# Patient Record
Sex: MALE | Race: WHITE | HISPANIC OR LATINO | Employment: FULL TIME | URBAN - METROPOLITAN AREA
[De-identification: names, ages, dates, MRNs, and addresses within clinical notes are randomized per-mention and may not be internally consistent; named-entity substitution may affect disease eponyms.]

---

## 2018-01-26 ENCOUNTER — HOSPITAL ENCOUNTER (EMERGENCY)
Facility: HOSPITAL | Age: 53
Discharge: HOME | End: 2018-01-26
Attending: EMERGENCY MEDICINE
Payer: COMMERCIAL

## 2018-01-26 VITALS
HEART RATE: 66 BPM | OXYGEN SATURATION: 96 % | TEMPERATURE: 98 F | BODY MASS INDEX: 30.06 KG/M2 | WEIGHT: 210 LBS | HEIGHT: 70 IN | RESPIRATION RATE: 18 BRPM | SYSTOLIC BLOOD PRESSURE: 146 MMHG | DIASTOLIC BLOOD PRESSURE: 67 MMHG

## 2018-01-26 DIAGNOSIS — R11.2 NON-INTRACTABLE VOMITING WITH NAUSEA, UNSPECIFIED VOMITING TYPE: ICD-10-CM

## 2018-01-26 DIAGNOSIS — R10.13 EPIGASTRIC ABDOMINAL PAIN: Primary | ICD-10-CM

## 2018-01-26 LAB
ALBUMIN SERPL BCP-MCNC: 4.6 G/DL
ALP SERPL-CCNC: 94 U/L
ALT SERPL W/O P-5'-P-CCNC: 48 U/L
ANION GAP SERPL CALC-SCNC: 9 MMOL/L
AST SERPL-CCNC: 42 U/L
BASOPHILS # BLD AUTO: 0.01 K/UL
BASOPHILS NFR BLD: 0.2 %
BILIRUB SERPL-MCNC: 0.8 MG/DL
BUN SERPL-MCNC: 14 MG/DL
CALCIUM SERPL-MCNC: 10.2 MG/DL
CHLORIDE SERPL-SCNC: 103 MMOL/L
CO2 SERPL-SCNC: 25 MMOL/L
CREAT SERPL-MCNC: 1.2 MG/DL
DIFFERENTIAL METHOD: ABNORMAL
EOSINOPHIL # BLD AUTO: 0 K/UL
EOSINOPHIL NFR BLD: 0.2 %
ERYTHROCYTE [DISTWIDTH] IN BLOOD BY AUTOMATED COUNT: 13.4 %
EST. GFR  (AFRICAN AMERICAN): >60 ML/MIN/1.73 M^2
EST. GFR  (NON AFRICAN AMERICAN): >60 ML/MIN/1.73 M^2
GLUCOSE SERPL-MCNC: 162 MG/DL
HCT VFR BLD AUTO: 44.7 %
HGB BLD-MCNC: 15.6 G/DL
LIPASE SERPL-CCNC: 6 U/L
LYMPHOCYTES # BLD AUTO: 1.1 K/UL
LYMPHOCYTES NFR BLD: 16.8 %
MCH RBC QN AUTO: 30.1 PG
MCHC RBC AUTO-ENTMCNC: 34.9 G/DL
MCV RBC AUTO: 86 FL
MONOCYTES # BLD AUTO: 0.3 K/UL
MONOCYTES NFR BLD: 4 %
NEUTROPHILS # BLD AUTO: 5.2 K/UL
NEUTROPHILS NFR BLD: 78.8 %
PLATELET # BLD AUTO: 249 K/UL
PMV BLD AUTO: 9.6 FL
POTASSIUM SERPL-SCNC: 3.9 MMOL/L
PROT SERPL-MCNC: 7.9 G/DL
RBC # BLD AUTO: 5.18 M/UL
SODIUM SERPL-SCNC: 137 MMOL/L
TROPONIN I SERPL DL<=0.01 NG/ML-MCNC: <0.006 NG/ML
WBC # BLD AUTO: 6.55 K/UL

## 2018-01-26 PROCEDURE — 84484 ASSAY OF TROPONIN QUANT: CPT

## 2018-01-26 PROCEDURE — 83690 ASSAY OF LIPASE: CPT

## 2018-01-26 PROCEDURE — 93005 ELECTROCARDIOGRAM TRACING: CPT

## 2018-01-26 PROCEDURE — 99284 EMERGENCY DEPT VISIT MOD MDM: CPT | Mod: 25

## 2018-01-26 PROCEDURE — 85025 COMPLETE CBC W/AUTO DIFF WBC: CPT

## 2018-01-26 PROCEDURE — 25000003 PHARM REV CODE 250: Performed by: EMERGENCY MEDICINE

## 2018-01-26 PROCEDURE — 93010 ELECTROCARDIOGRAM REPORT: CPT | Mod: ,,, | Performed by: INTERNAL MEDICINE

## 2018-01-26 PROCEDURE — 80053 COMPREHEN METABOLIC PANEL: CPT

## 2018-01-26 RX ORDER — PANTOPRAZOLE SODIUM 40 MG/1
40 TABLET, DELAYED RELEASE ORAL
Status: COMPLETED | OUTPATIENT
Start: 2018-01-26 | End: 2018-01-26

## 2018-01-26 RX ORDER — ETOMIDATE 2 MG/ML
INJECTION INTRAVENOUS
Status: DISCONTINUED
Start: 2018-01-26 | End: 2018-01-26 | Stop reason: HOSPADM

## 2018-01-26 RX ORDER — DICYCLOMINE HYDROCHLORIDE 20 MG/1
20 TABLET ORAL 3 TIMES DAILY PRN
Qty: 20 TABLET | Refills: 0 | Status: SHIPPED | OUTPATIENT
Start: 2018-01-26 | End: 2018-02-25

## 2018-01-26 RX ORDER — ONDANSETRON 4 MG/1
4 TABLET, FILM COATED ORAL EVERY 8 HOURS PRN
Qty: 12 TABLET | Refills: 0 | Status: SHIPPED | OUTPATIENT
Start: 2018-01-26

## 2018-01-26 RX ORDER — PROPOFOL 10 MG/ML
INJECTION, EMULSION INTRAVENOUS
Status: DISCONTINUED
Start: 2018-01-26 | End: 2018-01-26 | Stop reason: HOSPADM

## 2018-01-26 RX ORDER — OMEPRAZOLE 20 MG/1
20 CAPSULE, DELAYED RELEASE ORAL DAILY
Qty: 30 CAPSULE | Refills: 0 | Status: SHIPPED | OUTPATIENT
Start: 2018-01-26 | End: 2019-01-26

## 2018-01-26 RX ORDER — ATORVASTATIN CALCIUM 10 MG/1
10 TABLET, FILM COATED ORAL DAILY
COMMUNITY

## 2018-01-26 RX ADMIN — LIDOCAINE HYDROCHLORIDE: 20 SOLUTION ORAL; TOPICAL at 06:01

## 2018-01-26 RX ADMIN — PANTOPRAZOLE SODIUM 40 MG: 40 TABLET, DELAYED RELEASE ORAL at 06:01

## 2018-01-26 NOTE — ED NOTES
While waiting for room, pt in bathroom retching/vomiting; pt reports that he vomited all the food he ate earlier; pt reports that he feels better after vomiting.

## 2018-01-26 NOTE — ED PROVIDER NOTES
"Encounter Date: 1/26/2018    SCRIBE #1 NOTE: I, Romeo Hernandez, am scribing for, and in the presence of,  Vincent Huff MD. I have scribed the following portions of the note - Other sections scribed: HPI and ROS.       History     Chief Complaint   Patient presents with    Abdominal Pain     pt reports epigastric pain/bloating since eating lunch (grilled meat) yesterday around noon; pt denies n/v/d;     CC: Abdominal Pain     HPI: This 52 y.o M with GERD and PSHx of appendectomy presents to the ED c/o acute onset of constant and moderate (5/10) epigastric pain with associated "bloating" which began x1 hour after eating lunch yesterday. The pt reports an episode of reflux PTA, which has since resolved. The pt also reports nausea and one episode of emesis while waiting to be seen. The pt notes he drinks ETOH daily. No exacerbating or alleviating factors. No previous episodes of similar symptoms. The pt denies fever, diarrhea, constipation, difficulty urinating, chest pain, hematemesis and blood in stool. No prior tx.       The history is provided by the patient. No  was used.     Review of patient's allergies indicates:  No Known Allergies  Past Medical History:   Diagnosis Date    GERD (gastroesophageal reflux disease)      Past Surgical History:   Procedure Laterality Date    APPENDECTOMY       History reviewed. No pertinent family history.  Social History   Substance Use Topics    Smoking status: Never Smoker    Smokeless tobacco: Never Used    Alcohol use Yes      Comment: 4 drinks a day     Review of Systems   Constitutional: Negative for chills and fever.   HENT: Negative for rhinorrhea and sore throat.    Eyes: Negative for redness.   Respiratory: Negative for cough.    Cardiovascular: Negative for chest pain.   Gastrointestinal: Positive for abdominal pain (epigastric), nausea and vomiting. Negative for blood in stool, constipation and diarrhea.   Genitourinary: Negative for " difficulty urinating and dysuria.   Musculoskeletal: Negative for back pain.   Skin: Negative for color change.   Neurological: Negative for syncope and weakness.   Psychiatric/Behavioral: The patient is not nervous/anxious.        Physical Exam     Initial Vitals [01/26/18 0430]   BP Pulse Resp Temp SpO2   (!) 146/95 73 18 98.2 °F (36.8 °C) 100 %      MAP       112         Physical Exam    Nursing note and vitals reviewed.  Constitutional: He appears well-developed and well-nourished.   HENT:   Head: Atraumatic.   Eyes: EOM are normal. Pupils are equal, round, and reactive to light.   Neck: Normal range of motion. Neck supple. No JVD present.   Cardiovascular: Normal rate, regular rhythm, normal heart sounds and intact distal pulses. Exam reveals no gallop and no friction rub.    No murmur heard.  Pulmonary/Chest: Breath sounds normal. No respiratory distress. He has no wheezes. He has no rhonchi. He has no rales. He exhibits no tenderness.   Abdominal: Soft. Bowel sounds are normal.   Mild epigastric ttp. No RUQ ttp. No RLQ or LLQ ttp. No rebound or guarding. No cvattp.    Musculoskeletal: Normal range of motion.   Lymphadenopathy:     He has no cervical adenopathy.   Neurological: He is alert and oriented to person, place, and time. He has normal strength.   Skin: Skin is warm and dry.   Psychiatric: He has a normal mood and affect. Thought content normal.         ED Course   Procedures  Labs Reviewed   CBC W/ AUTO DIFFERENTIAL - Abnormal; Notable for the following:        Result Value    Gran% 78.8 (*)     Lymph% 16.8 (*)     All other components within normal limits   COMPREHENSIVE METABOLIC PANEL - Abnormal; Notable for the following:     Glucose 162 (*)     AST 42 (*)     ALT 48 (*)     All other components within normal limits   LIPASE   TROPONIN I        Patient stating some improvement with GI cocktail. No nausea at this time. Patient only rates pain as minimal. Repeat abdominal exam shows no changes. VSS>  No fever. No leukocytosis. Normal lipase. Patient has minimal AST/ALT elevations. No peritoneal signs. Patient feel he has food poisoning from a grilled undercooked beef he ate yesterday. Discussed CT/US with the patient. Patient states he does not want further testing at this time due to him feeling much improved. We discussed Nausea medication and anti cramping medication and abdominal precautions. Patient requesting to perform this plan with self observation at home.                 Scribe Attestation:   Scribe #1: I performed the above scribed service and the documentation accurately describes the services I performed. I attest to the accuracy of the note.    Attending Attestation:           Physician Attestation for Scribe:  Physician Attestation Statement for Scribe #1: I, Vincent Huff MD, reviewed documentation, as scribed by Romeo Hernandez in my presence, and it is both accurate and complete.                 ED Course      Clinical Impression:   The primary encounter diagnosis was Epigastric abdominal pain. A diagnosis of Non-intractable vomiting with nausea, unspecified vomiting type was also pertinent to this visit.                           Vincent Huff MD  01/26/18 0704

## 2018-01-26 NOTE — ED TRIAGE NOTES
"Pt c/o abdominal pain x 1 day.  Pt reports about 1 hour after eating yesterday, he began to have stomach pain.  Pt reports "Just" vomiting in restroom and now feels better with little abdominal pain.    "

## 2018-01-26 NOTE — ED TRIAGE NOTES
"Pt reports to ED via personal transportation with c/o "stomach ache"; pt states "I think I might have food poisoning"; pt c/o epigastric/periumbilical pain and bloating starting yesterday at noon after eating grilled meats from a restaurant; pt denies any N/V/D; pt reports that he feels bloated; pt AAOx4; no acute distress noted;  "

## 2018-01-26 NOTE — ED NOTES
"Report received from LORAINE Templeton. Pt lying in bed. Rates pain to abdomen 5/10. Pt states "my pain is getting better". Respirations even and unlabored. Call light in reach. Side rails up. Bed lowered. Will continued to monitor.   "

## 2018-01-28 ENCOUNTER — HOSPITAL ENCOUNTER (INPATIENT)
Facility: HOSPITAL | Age: 53
LOS: 4 days | Discharge: HOME | DRG: 390 | End: 2018-02-01
Attending: EMERGENCY MEDICINE | Admitting: SURGERY
Payer: COMMERCIAL

## 2018-01-28 DIAGNOSIS — K91.30 SMALL BOWEL OBSTRUCTION DUE TO POSTOPERATIVE ADHESIONS: Primary | ICD-10-CM

## 2018-01-28 LAB
ALBUMIN SERPL BCP-MCNC: 4.6 G/DL
ALP SERPL-CCNC: 95 U/L
ALT SERPL W/O P-5'-P-CCNC: 38 U/L
ANION GAP SERPL CALC-SCNC: 11 MMOL/L
AST SERPL-CCNC: 29 U/L
BASOPHILS # BLD AUTO: 0.01 K/UL
BASOPHILS NFR BLD: 0.2 %
BILIRUB SERPL-MCNC: 1.1 MG/DL
BUN SERPL-MCNC: 11 MG/DL
CALCIUM SERPL-MCNC: 10.1 MG/DL
CHLORIDE SERPL-SCNC: 98 MMOL/L
CO2 SERPL-SCNC: 28 MMOL/L
CREAT SERPL-MCNC: 1.3 MG/DL
DIFFERENTIAL METHOD: ABNORMAL
EOSINOPHIL # BLD AUTO: 0 K/UL
EOSINOPHIL NFR BLD: 0.7 %
ERYTHROCYTE [DISTWIDTH] IN BLOOD BY AUTOMATED COUNT: 13.2 %
EST. GFR  (AFRICAN AMERICAN): >60 ML/MIN/1.73 M^2
EST. GFR  (NON AFRICAN AMERICAN): >60 ML/MIN/1.73 M^2
GLUCOSE SERPL-MCNC: 129 MG/DL
HCT VFR BLD AUTO: 45.5 %
HGB BLD-MCNC: 15.9 G/DL
LIPASE SERPL-CCNC: 5 U/L
LYMPHOCYTES # BLD AUTO: 1.1 K/UL
LYMPHOCYTES NFR BLD: 18.1 %
MCH RBC QN AUTO: 30.3 PG
MCHC RBC AUTO-ENTMCNC: 34.9 G/DL
MCV RBC AUTO: 87 FL
MONOCYTES # BLD AUTO: 0.3 K/UL
MONOCYTES NFR BLD: 5.4 %
NEUTROPHILS # BLD AUTO: 4.4 K/UL
NEUTROPHILS NFR BLD: 75.6 %
PLATELET # BLD AUTO: 229 K/UL
PMV BLD AUTO: 9.5 FL
POTASSIUM SERPL-SCNC: 4.1 MMOL/L
PROT SERPL-MCNC: 8.2 G/DL
RBC # BLD AUTO: 5.25 M/UL
SODIUM SERPL-SCNC: 137 MMOL/L
WBC # BLD AUTO: 5.79 K/UL

## 2018-01-28 PROCEDURE — 25500020 PHARM REV CODE 255: Performed by: EMERGENCY MEDICINE

## 2018-01-28 PROCEDURE — 63600175 PHARM REV CODE 636 W HCPCS: Performed by: PHYSICIAN ASSISTANT

## 2018-01-28 PROCEDURE — 12000002 HC ACUTE/MED SURGE SEMI-PRIVATE ROOM

## 2018-01-28 PROCEDURE — 83690 ASSAY OF LIPASE: CPT

## 2018-01-28 PROCEDURE — 96374 THER/PROPH/DIAG INJ IV PUSH: CPT

## 2018-01-28 PROCEDURE — 25000003 PHARM REV CODE 250: Performed by: PHYSICIAN ASSISTANT

## 2018-01-28 PROCEDURE — 80053 COMPREHEN METABOLIC PANEL: CPT

## 2018-01-28 PROCEDURE — 85025 COMPLETE CBC W/AUTO DIFF WBC: CPT

## 2018-01-28 PROCEDURE — 43752 NASAL/OROGASTRIC W/TUBE PLMT: CPT

## 2018-01-28 PROCEDURE — 99284 EMERGENCY DEPT VISIT MOD MDM: CPT | Mod: 25

## 2018-01-28 RX ORDER — PANTOPRAZOLE SODIUM 40 MG/10ML
40 INJECTION, POWDER, LYOPHILIZED, FOR SOLUTION INTRAVENOUS DAILY
Status: DISCONTINUED | OUTPATIENT
Start: 2018-01-29 | End: 2018-01-30

## 2018-01-28 RX ORDER — HYDROMORPHONE HYDROCHLORIDE 2 MG/ML
1 INJECTION, SOLUTION INTRAMUSCULAR; INTRAVENOUS; SUBCUTANEOUS EVERY 4 HOURS PRN
Status: DISCONTINUED | OUTPATIENT
Start: 2018-01-28 | End: 2018-02-01 | Stop reason: HOSPADM

## 2018-01-28 RX ORDER — HYDROMORPHONE HYDROCHLORIDE 2 MG/ML
0.5 INJECTION, SOLUTION INTRAMUSCULAR; INTRAVENOUS; SUBCUTANEOUS
Status: COMPLETED | OUTPATIENT
Start: 2018-01-28 | End: 2018-01-28

## 2018-01-28 RX ORDER — PROCHLORPERAZINE EDISYLATE 5 MG/ML
5 INJECTION INTRAMUSCULAR; INTRAVENOUS EVERY 6 HOURS PRN
Status: DISCONTINUED | OUTPATIENT
Start: 2018-01-28 | End: 2018-02-01 | Stop reason: HOSPADM

## 2018-01-28 RX ORDER — ONDANSETRON 2 MG/ML
4 INJECTION INTRAMUSCULAR; INTRAVENOUS EVERY 8 HOURS PRN
Status: DISCONTINUED | OUTPATIENT
Start: 2018-01-28 | End: 2018-02-01 | Stop reason: HOSPADM

## 2018-01-28 RX ORDER — SODIUM CHLORIDE 9 MG/ML
INJECTION, SOLUTION INTRAVENOUS CONTINUOUS
Status: DISCONTINUED | OUTPATIENT
Start: 2018-01-28 | End: 2018-01-30

## 2018-01-28 RX ORDER — ACETAMINOPHEN 325 MG/1
650 TABLET ORAL EVERY 8 HOURS PRN
Status: DISCONTINUED | OUTPATIENT
Start: 2018-01-28 | End: 2018-01-29

## 2018-01-28 RX ORDER — HYDROMORPHONE HYDROCHLORIDE 2 MG/ML
0.5 INJECTION, SOLUTION INTRAMUSCULAR; INTRAVENOUS; SUBCUTANEOUS EVERY 4 HOURS PRN
Status: DISCONTINUED | OUTPATIENT
Start: 2018-01-28 | End: 2018-02-01 | Stop reason: HOSPADM

## 2018-01-28 RX ORDER — ATORVASTATIN CALCIUM 10 MG/1
10 TABLET, FILM COATED ORAL DAILY
Status: DISCONTINUED | OUTPATIENT
Start: 2018-01-29 | End: 2018-01-29

## 2018-01-28 RX ADMIN — SODIUM CHLORIDE: 0.9 INJECTION, SOLUTION INTRAVENOUS at 04:01

## 2018-01-28 RX ADMIN — IOHEXOL 100 ML: 350 INJECTION, SOLUTION INTRAVENOUS at 02:01

## 2018-01-28 RX ADMIN — HYDROMORPHONE HYDROCHLORIDE 0.5 MG: 2 INJECTION, SOLUTION INTRAMUSCULAR; INTRAVENOUS; SUBCUTANEOUS at 01:01

## 2018-01-28 RX ADMIN — HYDROMORPHONE HYDROCHLORIDE 1 MG: 2 INJECTION, SOLUTION INTRAMUSCULAR; INTRAVENOUS; SUBCUTANEOUS at 05:01

## 2018-01-28 RX ADMIN — HYDROMORPHONE HYDROCHLORIDE 1 MG: 2 INJECTION, SOLUTION INTRAMUSCULAR; INTRAVENOUS; SUBCUTANEOUS at 09:01

## 2018-01-28 NOTE — ED NOTES
Pt. Was able to tolerate NG placement- pt. Placed to low intermitting suction. Noted in cannister 200 ml of clear red/pink contrast. Provider made aware

## 2018-01-28 NOTE — ED TRIAGE NOTES
No BM since Thursday seen her Friday for upper abdominal pain thought food poisoning now whole stomach hurts after vomiting says he drank a bottle ofm citrate of magnesia without relief feels like gas

## 2018-01-28 NOTE — ED PROVIDER NOTES
"Encounter Date: 1/28/2018    SCRIBE #1 NOTE: I, Cari Paez, am scribing for, and in the presence of, Leslee Sullivan PA-C. Other sections scribed: HPI/ROS.       History     Chief Complaint   Patient presents with    Constipation     "I came here Friday for indigestion and food poisioning I thought it was, my stomach still hurting and I haven't been able to use bathroom." reports last BM Thursday, reports passing "a little" gas    Abdominal Pain     CC: Abdominal pain    Pt is a 52 y.o. male w/ GERD, HCL and hx of appendectomy presenting to the ED c/o constant, worsening, generalized abdominal pain, distention, decreased appetite (no food intake), chills, and constipation x 3 days (since 1/25/18). Pt states he was having normal bowel movements prior to 1/25. Pt was seen here on 1/26/18 for epigastric pain, but he states his sx have worsened and pain is now generalized to the whole abdomen. Pt attempted drinking a bottle of magnesium citrate at about 1000 this morning, but he has not yet had a bowel movement. Pt states he saw his PCP on 1/26/18 and received an rx for Norco, which he states he has taken once per day since then with no relief (last dose at 0800 this AM). Pt endorses 2 episodes of emesis, once each on 1/26 and 1/27. Pt states he normally drinks 2-4 alcoholic drinks per day (cans of beer or shots of liquor like whiskey), but denies any EtOH consumption in the past 3 days 2nd/to pain.    Pt denies fever or any urinary sx. Pt reports no further symptoms.      The history is provided by the patient.     Review of patient's allergies indicates:  No Known Allergies  Past Medical History:   Diagnosis Date    GERD (gastroesophageal reflux disease)      Past Surgical History:   Procedure Laterality Date    APPENDECTOMY       History reviewed. No pertinent family history.  Social History   Substance Use Topics    Smoking status: Never Smoker    Smokeless tobacco: Never Used    Alcohol use Yes      " Comment: 4 drinks a day     Review of Systems   Constitutional: Positive for appetite change (+) decreased and chills. Negative for fever.   HENT: Negative for congestion.    Eyes: Negative for pain.   Respiratory: Negative for choking.    Cardiovascular: Negative for palpitations.   Gastrointestinal: Positive for abdominal distention, abdominal pain, constipation, nausea and vomiting. Negative for diarrhea.   Genitourinary: Negative for dysuria, frequency, hematuria and urgency.   Musculoskeletal: Negative for neck pain.   Skin: Negative for wound.   Neurological: Negative for headaches.       Physical Exam     Initial Vitals [01/28/18 1129]   BP Pulse Resp Temp SpO2   134/75 71 20 98.2 °F (36.8 °C) 97 %      MAP       94.67         Physical Exam    Nursing note and vitals reviewed.  Constitutional: He appears well-developed and well-nourished. No distress.   HENT:   Head: Normocephalic.   Right Ear: External ear normal.   Left Ear: External ear normal.   Nose: Nose normal.   Mouth/Throat: Oropharynx is clear and moist. No oropharyngeal exudate.   Eyes: Conjunctivae are normal.   Neck: Neck supple.   Cardiovascular: Normal rate and regular rhythm. Exam reveals no gallop and no friction rub.    No murmur heard.  Pulmonary/Chest: Breath sounds normal. No respiratory distress. He has no wheezes. He has no rhonchi. He has no rales.   Abdominal: He exhibits distension. There is generalized tenderness. There is no rigidity, no rebound, no guarding and no CVA tenderness.   Hypoactive bowel sounds in upper quadrants   Lymphadenopathy:     He has no cervical adenopathy.   Neurological: He is alert.   Skin: Skin is warm and dry. No rash noted.   Psychiatric: He has a normal mood and affect.         ED Course   Procedures  Labs Reviewed   CBC W/ AUTO DIFFERENTIAL - Abnormal; Notable for the following:        Result Value    Gran% 75.6 (*)     All other components within normal limits   COMPREHENSIVE METABOLIC PANEL -  Abnormal; Notable for the following:     Glucose 129 (*)     Total Bilirubin 1.1 (*)     All other components within normal limits   LIPASE             Medical Decision Making:   Initial Assessment:   Patient is a 52-year-old male with history of hyperlipidemia and PSHx of appendectomy (in [place] 40 years ago) presents for 3 day history of constant abdominal pain.  Patient was evaluated at this facility 2 days ago for the symptoms and had only epigastric pain. Labs were ordered and unremarkable but he refused imaging at that time. Pt presents today because pain has become worse, abdomen has become distended and he has had no BM in 3 days. 1 episode of vomiting 1 day ago and yesterday. No further episodes of vomiting. Pt has never had colonoscopy. Pt reports last meal was soup at 1400 yesterday and 1 bottled water, juice and magnesium citrate at 0800 today.     Differential Diagnosis:   Viral syndrome, bowel obstruction, constipation, pancreatitis, cholecystitis, acute surgical abdomen, fecal impaction, malignancy   ED Management:  Patient is afebrile, well-appearing in no acute distress.  Exam findings as detailed above. Labs with gran 75%, Tbili 1.1.  CT abdomen and pelvis with SBO and 2 small bowel loops measuring up to 3.7 cm. Fluid within central mesentery. Zone of transition in region of lower central abdomen with collapsed bowel loops seen distal to site of obstruction. Pt was given Dilaudid in ED with complete relief of his abdominal pain. NG tube placed in ED with further mild improvement of symptoms.   Discussed pt with Dr. Rayo, on-call General Surgery, who recommends admission to inpatient for NPO, NG tube with suction with repeat films tomorrow and possible surgical intervention.     Discussed pt with Dr. Quiñonez who agrees with assessment and plan.             Scribe Attestation:   Scribe #1: I performed the above scribed service and the documentation accurately describes the services I performed. I  attest to the accuracy of the note.    Attending Attestation:           Physician Attestation for Scribe:  Physician Attestation Statement for Scribe #1: I, Leslee Sullivan PA-C, reviewed documentation, as scribed by Cari Paez in my presence, and it is both accurate and complete.                 ED Course      Clinical Impression:   The encounter diagnosis was Small bowel obstruction due to postoperative adhesions.                           Leslee Sullivan PA-C  01/28/18 4141

## 2018-01-29 PROCEDURE — 25500020 PHARM REV CODE 255: Performed by: SURGERY

## 2018-01-29 PROCEDURE — C9113 INJ PANTOPRAZOLE SODIUM, VIA: HCPCS | Performed by: PHYSICIAN ASSISTANT

## 2018-01-29 PROCEDURE — 63600175 PHARM REV CODE 636 W HCPCS: Performed by: PHYSICIAN ASSISTANT

## 2018-01-29 PROCEDURE — 25000003 PHARM REV CODE 250: Performed by: PHYSICIAN ASSISTANT

## 2018-01-29 PROCEDURE — 12000002 HC ACUTE/MED SURGE SEMI-PRIVATE ROOM

## 2018-01-29 PROCEDURE — 94761 N-INVAS EAR/PLS OXIMETRY MLT: CPT

## 2018-01-29 RX ADMIN — SODIUM CHLORIDE: 0.9 INJECTION, SOLUTION INTRAVENOUS at 08:01

## 2018-01-29 RX ADMIN — DIATRIZOATE MEGLUMINE AND DIATRIZOATE SODIUM 240 ML: 660; 100 LIQUID ORAL; RECTAL at 11:01

## 2018-01-29 RX ADMIN — SODIUM CHLORIDE: 0.9 INJECTION, SOLUTION INTRAVENOUS at 06:01

## 2018-01-29 RX ADMIN — HYDROMORPHONE HYDROCHLORIDE 1 MG: 2 INJECTION, SOLUTION INTRAMUSCULAR; INTRAVENOUS; SUBCUTANEOUS at 03:01

## 2018-01-29 RX ADMIN — HYDROMORPHONE HYDROCHLORIDE 1 MG: 2 INJECTION, SOLUTION INTRAMUSCULAR; INTRAVENOUS; SUBCUTANEOUS at 07:01

## 2018-01-29 RX ADMIN — HYDROMORPHONE HYDROCHLORIDE 1 MG: 2 INJECTION, SOLUTION INTRAMUSCULAR; INTRAVENOUS; SUBCUTANEOUS at 11:01

## 2018-01-29 RX ADMIN — PANTOPRAZOLE SODIUM 40 MG: 40 INJECTION, POWDER, FOR SOLUTION INTRAVENOUS at 08:01

## 2018-01-29 NOTE — PROGRESS NOTES
WRITTEN HEALTHCARE DISCHARGE INFORMATION     Things that YOU are responsible for to Manage Your Care At Home:  1. Getting your prescriptions filled.  2. Taking you medications as directed. DO NOT MISS ANY DOSES!  3. Going to your follow-up doctor appointments. This is important because it allows the doctor to monitor your progress and to determine if any changes need to be made to your treatment plan.    If you are unable to make your follow up appointments, please call the number listed and reschedule this appointment.     After discharge, if you need assistance, you can call [facility] at the number below.     If you are experience any signs or symptoms, Call your doctor or Call 911 and come to your nearest Emergency Room.    Thank you for choosing [facility] and allowing us to care for you.   From your care management team: Shelby VALLE,RSW [phone]     You should receive a call from [facility] Discharge Department within 48-72 hours to help manage your care after discharge. Please try to make sure that you answer your phone for this important phone call.     Follow-up Information     Rey Garrido III, MD On 2/12/2018.    Specialty:  Surgery  Why:  2pm.  Contact information:  [address] [place], [place] [address] [address] [phone]

## 2018-01-29 NOTE — PROGRESS NOTES
"TN reviewed follow up appointment information as well as  "GI discharge instructions" handout with patient using teach back.  Patient is in agreement and verbalized an understanding. Placed discharge information in blue discharge folder.  TN also reviewed patient responsibility checklist with him using teach back. Patient was able to verbalize his responsibilities after discharge to manage his care at home bein. Going to follow up appointments   2. Picking up rx from the pharmacy when discharged  3. Taking his medications as prescribed       "

## 2018-01-29 NOTE — PROGRESS NOTES
gen surg  sbft results noted,contrast in colon at 2 hrs but some sb distention  Leave ngt in for now and observe-hopefully resolving

## 2018-01-29 NOTE — PLAN OF CARE
Problem: Bowel Obstruction (Adult)  Intervention: Monitor/Manage Gastrointestinal Function/Elimination   01/29/18 1617   Monitor/Manage Gastrointestinal Function/Elimination   Abdominal Appearance distended   Activity   Activity Type ambulated in room   Monitor/Manage Chemotherapy Gastrointestinal Effects   Bowel Dysfunction Management hygiene measures promoted;toileting offered     Intervention: Support/Optimize Psychosocial Response to Illness   01/29/18 1617   Coping/Psychosocial Interventions   Supportive Measures self-care encouraged   Psychosocial Support   Family/Support System Care presence promoted     Intervention: Monitor/Manage Fluid Electrolyte Balance   01/29/18 1617   Nutrition Interventions   Fluid/Electrolyte Management intravenous fluids adjusted       Goal: Signs and Symptoms of Listed Potential Problems Will be Absent, Minimized or Managed (Bowel Obstruction)  Signs and symptoms of listed potential problems will be absent, minimized or managed by discharge/transition of care (reference Bowel Obstruction (Adult) CPG).   Outcome: Ongoing (interventions implemented as appropriate)  Patient resting comfortably on today. Vss, afebrile. NPO throughout day. No c/o nausea or vomiting noted. L Nare NGT to LIWS. Scant drainage noted. Abdomen firm and distended with tenderness noted upon palpation. Small bowel follow through complete. Patient had 3 loose formed BM's. Pain well controlled with hydromorphone IV q4 as needed. Ambulating to bathroom without difficulty. Spouse at bedside. Will continue to follow POC.    01/29/18 1617   Bowel Obstruction   Problems Assessed (Bowel Obstruction) diarrhea;fluid volume deficit/hypovolemia;nausea and vomiting;pain;peritonitis;undernutrition   Problems Present (Bowel Obstruction) diarrhea;pain

## 2018-01-29 NOTE — PLAN OF CARE
Problem: Fall Risk (Adult)  Goal: Identify Related Risk Factors and Signs and Symptoms  Related risk factors and signs and symptoms are identified upon initiation of Human Response Clinical Practice Guideline (CPG)   Outcome: Ongoing (interventions implemented as appropriate)  Free of falls. Call light in reach.     Problem: Patient Care Overview  Goal: Plan of Care Review  Outcome: Ongoing (interventions implemented as appropriate)  Pt progressing. Oriented x4. Voiding well. Skin integrity maintained. Pain controlled. VS stable. NPO. IV fluids maintained. Free of falls. Call light in reach. Low bed. No issues during shift. Continue plan of care.      Problem: Bowel Obstruction (Adult)  Goal: Signs and Symptoms of Listed Potential Problems Will be Absent, Minimized or Managed (Bowel Obstruction)  Signs and symptoms of listed potential problems will be absent, minimized or managed by discharge/transition of care (reference Bowel Obstruction (Adult) CPG).   Outcome: Ongoing (interventions implemented as appropriate)  NG tube. Low output. Pt had loose BM. NPO. IV fluids maintained. Pain controlled.

## 2018-01-29 NOTE — PLAN OF CARE
Problem: Bowel Obstruction (Adult)  Intervention: Monitor/Manage Gastrointestinal Function/Elimination   01/28/18 1835   Monitor/Manage Gastrointestinal Function/Elimination   Abdominal Appearance distended   Activity   Activity Type activity adjusted per tolerance   Monitor/Manage Chemotherapy Gastrointestinal Effects   Bowel Dysfunction Management toileting offered     Intervention: Monitor/Manage Pain   01/28/18 1835   Safety Interventions   Medication Review/Management medications reviewed   Pain/Comfort/Sleep Interventions   Pain Management Interventions pain management plan reviewed with patient/caregiver;position adjusted;quiet environment facilitated     Intervention: Support/Optimize Psychosocial Response to Illness   01/28/18 1835   Coping/Psychosocial Interventions   Supportive Measures relaxation techniques promoted;verbalization of feelings encouraged   Psychosocial Support   Family/Support System Care involvement promoted     Intervention: Monitor/Manage Fluid Electrolyte Balance   01/28/18 1835   Nutrition Interventions   Fluid/Electrolyte Management intravenous fluids adjusted       Goal: Signs and Symptoms of Listed Potential Problems Will be Absent, Minimized or Managed (Bowel Obstruction)  Signs and symptoms of listed potential problems will be absent, minimized or managed by discharge/transition of care (reference Bowel Obstruction (Adult) CPG).  Outcome: Ongoing (interventions implemented as appropriate)   01/28/18 1835   Bowel Obstruction   Problems Assessed (Bowel Obstruction) fluid volume deficit/hypovolemia;nausea and vomiting;pain;situational response   Problems Present (Bowel Obstruction) nausea and vomiting;pain

## 2018-01-29 NOTE — PLAN OF CARE
"TN met with patient at bedside to complete discharge needs assessment. TN explained duties of case management to patient.  TN reviewed and provided  "[facility] Packet", "Discharge Planning Begins on Admission" brochure and discussed "Help at Home". TN also discussed his responsibilities to manage his health at home. Patient was informed to leave folder at bedside during hospital stay. Contact information added to white board.    Patient Preferred Pharmacy:   [facility] [address] [facility] [place] [address]  [place] [address]  Phone: [phone] Fax: [phone]       01/29/18 1406   Discharge Assessment   Assessment Type Discharge Planning Assessment   Confirmed/corrected address and phone number on facesheet? Yes   Assessment information obtained from? Patient   Prior to hospitilization cognitive status: Alert/Oriented   Prior to hospitalization functional status: Independent   Current cognitive status: Alert/Oriented   Current Functional Status: Independent   Lives With spouse   Able to Return to Prior Arrangements yes   Is patient able to care for self after discharge? Yes   Who are your caregiver(s) and their phone number(s)? Spouse- Cristina @ [phone]   Patient's perception of discharge disposition home or selfcare   Readmission Within The Last 30 Days no previous admission in last 30 days   Patient currently being followed by outpatient case management? No   Patient currently receives any other outside agency services? No   Equipment Currently Used at Home none   Do you have any problems affording any of your prescribed medications? No   Is the patient taking medications as prescribed? yes   Does the patient have transportation home? Yes   Transportation Available car;family or friend will provide   Does the patient receive services at the [facility]? No   Discharge Plan A Home;Home with family   Discharge Plan B " Home;Home with family   Patient/Family In Agreement With Plan yes   Does the patient have transportation to healthcare appointments? Yes

## 2018-01-29 NOTE — NURSING
Patient arrived to floor via stretcher with wife at bedside. Moderate complaints of abdominal pain noted. Abdomen firm and distended with tenderness noted upon palpation. L Nare NGT connected to LIWS. No Nausea or vomiting noted.  IVF's started. Dr. Rayo notified of Patient arrival. Plan of care reviewed with patient and spouse, verbalizes understanding. Will continue to monitor

## 2018-01-29 NOTE — H&P
HISTORY OF PRESENT ILLNESS:  This is a 52-year-old male who several days ago had   the onset of generalized abdominal pain, nausea, bloating and vomiting after   eating meat.  He presented to the Emergency Room where he felt better and was   discharged home.  After discharge home, his symptoms quickly returned and he   returned to the Emergency Room.  His last bowel movement was several days ago.    Over the last night, he had a large brown bowel movement and feels a lot better.    He has never had symptoms like this in the past.  Now, all of his presenting   complaints have resolved.  He is hungry.  He denies abdominal pain.  He denies   shortness of breath or chest pain.    PAST MEDICAL HISTORY:  Hyperlipidemia.    PAST SURGICAL HISTORY:  Appendectomy as a child through a right lower paramedian   incision.    ALLERGIES:  No known drug allergies.    HOME MEDICATIONS:  See notes.    SOCIAL HISTORY:  He does not smoke.  He drinks two to four beers per day.    PHYSICAL EXAMINATION:  VITAL SIGNS:  Blood pressure 120/67, respiratory rate 17, pulse 66, and   temperature 98.  GENERAL:  Awake and alert.  HEENT:  No cervical or supraclavicular masses.  He has a nasogastric tube   draining a small amount of bilious material.  No cervical or supraclavicular   masses.  LUNGS:  Clear to auscultation bilaterally.  CARDIOVASCULAR:  Regular rate and rhythm.  AXILLA:  No masses bilaterally.  EXTREMITIES:  Palpable radial pulse bilaterally.  GENITOURINARY:  No inguinal hernia bilaterally.  ABDOMEN:  Soft, positive bowel sounds, nontender and nondistended.  No masses.    No hernia.  Healed right lower paramedian incision.    LABORATORY DATA:  His white blood cell count is 5, hematocrit 45, and platelet   count 229.  Creatinine is 1.3 and total bilirubin 1.1.  On presentation, he had   a CT scan of his abdomen and pelvis, which revealed findings consistent with a   small bowel obstruction with transition zone in the central abdomen.   I have   reviewed the films.    ASSESSMENT AND PLAN:  Small bowel obstruction from adhesions-admit, bowel rest,   nasogastric tube, attempt nonoperative management, there is not much come out of   his NG tube and his abdominal symptoms have resolved, we will obtain small   bowel follow-through with Gastrografin today (diagnostic/therapeutic), discussed   with the patient and wife at length, they are aware if obstructive process does   not resolve, he may require operative intervention in this hospital stay.      TROY/JACKIE  dd: 01/29/2018 06:18:01 (CST)  td: 01/29/2018 07:19:17 (CST)  Doc ID   #[id number]  Job ID #[id number]    CC:

## 2018-01-30 PROCEDURE — 25000003 PHARM REV CODE 250: Performed by: PHYSICIAN ASSISTANT

## 2018-01-30 PROCEDURE — 25000003 PHARM REV CODE 250: Performed by: SURGERY

## 2018-01-30 PROCEDURE — 63600175 PHARM REV CODE 636 W HCPCS: Performed by: PHYSICIAN ASSISTANT

## 2018-01-30 PROCEDURE — 12000002 HC ACUTE/MED SURGE SEMI-PRIVATE ROOM

## 2018-01-30 RX ORDER — PANTOPRAZOLE SODIUM 40 MG/1
40 TABLET, DELAYED RELEASE ORAL DAILY
Status: DISCONTINUED | OUTPATIENT
Start: 2018-01-30 | End: 2018-02-01 | Stop reason: HOSPADM

## 2018-01-30 RX ADMIN — SODIUM CHLORIDE: 0.9 INJECTION, SOLUTION INTRAVENOUS at 04:01

## 2018-01-30 RX ADMIN — PANTOPRAZOLE SODIUM 40 MG: 40 TABLET, DELAYED RELEASE ORAL at 08:01

## 2018-01-30 RX ADMIN — HYDROMORPHONE HYDROCHLORIDE 1 MG: 2 INJECTION, SOLUTION INTRAMUSCULAR; INTRAVENOUS; SUBCUTANEOUS at 04:01

## 2018-01-30 NOTE — PLAN OF CARE
Problem: Fall Risk (Adult)  Goal: Identify Related Risk Factors and Signs and Symptoms  Related risk factors and signs and symptoms are identified upon initiation of Human Response Clinical Practice Guideline (CPG)   Outcome: Ongoing (interventions implemented as appropriate)  Free of falls. Call light in reach.      Problem: Patient Care Overview  Goal: Plan of Care Review  Outcome: Ongoing (interventions implemented as appropriate)  Pt progressing. Oriented x4. Voiding well. Skin integrity maintained. Pain controlled. VS stable. NPO. IV fluids maintained. Free of falls. Call light in reach. Low bed. No issues during shift. Continue plan of care.        Problem: Bowel Obstruction (Adult)  Goal: Signs and Symptoms of Listed Potential Problems Will be Absent, Minimized or Managed (Bowel Obstruction)  Signs and symptoms of listed potential problems will be absent, minimized or managed by discharge/transition of care (reference Bowel Obstruction (Adult) CPG).   Outcome: Ongoing (interventions implemented as appropriate)  NG tube. Low output. NPO. IV fluids maintained. Pain controlled.

## 2018-01-30 NOTE — PROGRESS NOTES
"gen surg  No abd pain, having bm,s , hungry  No sig ngt output overnight  Blood pressure 137/86, pulse 66, temperature 97.9 °F (36.6 °C), temperature source Oral, resp. rate 16, height 5' 10" (1.778 m), weight 98.9 kg (218 lb 0.6 oz), SpO2 98 %.  abd soft, nl bs, nd  A/p sbo prob from adhesions-appears resoslved, dc ngt, start clears, adv as flor, if flor po prob dc tomorrow  "

## 2018-01-30 NOTE — PLAN OF CARE
Problem: Patient Care Overview  Goal: Plan of Care Review  Outcome: Ongoing (interventions implemented as appropriate)  NGT dc'd this morning, patient started on clears, tolerated well. Consumed 100%, no nausea or vomiting noted, diet advanced to full liquid for lunch, no nausea or vomiting noted. Denies complaints of abdominal pain. Ambulating to bathroom without difficulty. States he feels much better. Will continue to monitor.    01/30/18 5731   Coping/Psychosocial   Plan Of Care Reviewed With patient

## 2018-01-30 NOTE — PLAN OF CARE
01/30/18 1135   Final Note   Assessment Type Final Discharge Note   Discharge Disposition Home   What phone number can be called within the next 1-3 days to see how you are doing after discharge? [phone]   Hospital Follow Up  Appt(s) scheduled? Yes   Discharge plans and expectations educations in teach back method with documentation complete? Yes   Right Care Referral Info   Post Acute Recommendation No Care

## 2018-01-31 PROCEDURE — 25000003 PHARM REV CODE 250: Performed by: SURGERY

## 2018-01-31 PROCEDURE — 12000002 HC ACUTE/MED SURGE SEMI-PRIVATE ROOM

## 2018-01-31 RX ORDER — SODIUM CHLORIDE, SODIUM LACTATE, POTASSIUM CHLORIDE, CALCIUM CHLORIDE 600; 310; 30; 20 MG/100ML; MG/100ML; MG/100ML; MG/100ML
INJECTION, SOLUTION INTRAVENOUS CONTINUOUS
Status: DISCONTINUED | OUTPATIENT
Start: 2018-01-31 | End: 2018-02-01 | Stop reason: HOSPADM

## 2018-01-31 RX ADMIN — SODIUM CHLORIDE, SODIUM LACTATE, POTASSIUM CHLORIDE, AND CALCIUM CHLORIDE: .6; .31; .03; .02 INJECTION, SOLUTION INTRAVENOUS at 08:01

## 2018-01-31 RX ADMIN — PANTOPRAZOLE SODIUM 40 MG: 40 TABLET, DELAYED RELEASE ORAL at 10:01

## 2018-01-31 RX ADMIN — SODIUM CHLORIDE, SODIUM LACTATE, POTASSIUM CHLORIDE, AND CALCIUM CHLORIDE: .6; .31; .03; .02 INJECTION, SOLUTION INTRAVENOUS at 04:01

## 2018-01-31 NOTE — PLAN OF CARE
Problem: Fall Risk (Adult)  Goal: Identify Related Risk Factors and Signs and Symptoms  Related risk factors and signs and symptoms are identified upon initiation of Human Response Clinical Practice Guideline (CPG)   Outcome: Ongoing (interventions implemented as appropriate)  Free of falls. Call light in reach.      Problem: Patient Care Overview  Goal: Plan of Care Review  Outcome: Ongoing (interventions implemented as appropriate)  Pt progressing. Oriented x4. Voiding well. Skin integrity maintained. Pain controlled. VS stable.Free of falls. Call light in reach. Low bed. No issues during shift. Continue plan of care.        Problem: Bowel Obstruction (Adult)  Goal: Signs and Symptoms of Listed Potential Problems Will be Absent, Minimized or Managed (Bowel Obstruction)  Signs and symptoms of listed potential problems will be absent, minimized or managed by discharge/transition of care (reference Bowel Obstruction (Adult) CPG).   Outcome: Ongoing (interventions implemented as appropriate)  Pt now on regular diet. Complained of indigestion. No n/v. Denies pain.

## 2018-01-31 NOTE — PLAN OF CARE
Problem: Patient Care Overview  Goal: Plan of Care Review  Outcome: Ongoing (interventions implemented as appropriate)  Pt progressing. Oriented x 4. No s/s of distress noted. Denies pain/discomfort. IVF in progress. Tolerating diet well. Safety measures maintained. Call bell within reach. Will continue to monitor

## 2018-01-31 NOTE — TREATMENT PLAN
Diet advanced per orders. Patient consumed 100% of regular diet. Tolerated good, no nausea/ vomiting or diarrhea noted. Denies complaints of pain. Loose BM x 1 noted on today. IVF's dc'd per orders. Will continue to monitor

## 2018-01-31 NOTE — PROGRESS NOTES
"gen surg  flor po, some early satiety, having bm's  Blood pressure 129/80, pulse 65, temperature 98.4 °F (36.9 °C), temperature source Oral, resp. rate 18, height 5' 10" (1.778 m), weight 98.9 kg (218 lb 0.6 oz), SpO2 100 %.  abd soft, nl bs, nt, slight distention, no hernia  A/p sbo- early satiety concerning- will check abd film and if these symptoms do not resolve as day goes on may end up needing sx intervention this hosp stay  "

## 2018-02-01 VITALS
SYSTOLIC BLOOD PRESSURE: 107 MMHG | HEART RATE: 68 BPM | TEMPERATURE: 98 F | DIASTOLIC BLOOD PRESSURE: 56 MMHG | HEIGHT: 70 IN | BODY MASS INDEX: 31.22 KG/M2 | RESPIRATION RATE: 18 BRPM | OXYGEN SATURATION: 97 % | WEIGHT: 218.06 LBS

## 2018-02-01 PROCEDURE — 25000003 PHARM REV CODE 250: Performed by: SURGERY

## 2018-02-01 RX ADMIN — PANTOPRAZOLE SODIUM 40 MG: 40 TABLET, DELAYED RELEASE ORAL at 08:02

## 2018-02-01 NOTE — PROGRESS NOTES
"gen surg  flor po, no abd pain , no n/v, [id number] flatus,presenting c/os resolved  abd film yest no sign obstr  Blood pressure 115/65, pulse 64, temperature 97.3 °F (36.3 °C), temperature source Oral, resp. rate 17, height 5' 10" (1.778 m), weight 98.9 kg (218 lb 0.6 oz), SpO2 97 %.  abd soft, nl bs, ntnd  A/p sbo-appears resolved-dc sum dictated [id number]  "

## 2018-02-01 NOTE — PLAN OF CARE
Problem: Fall Risk (Adult)  Goal: Identify Related Risk Factors and Signs and Symptoms  Related risk factors and signs and symptoms are identified upon initiation of Human Response Clinical Practice Guideline (CPG)   Outcome: Ongoing (interventions implemented as appropriate)  Free of falls. Call light in reach.      Problem: Patient Care Overview  Goal: Plan of Care Review  Outcome: Ongoing (interventions implemented as appropriate)  Pt progressing. Oriented x4. Voiding well. Skin integrity maintained. Pain controlled. VS stable. IV fluids maintained. Free of falls. Call light in reach. Low bed. No issues during shift. Continue plan of care.        Problem: Bowel Obstruction (Adult)  Goal: Signs and Symptoms of Listed Potential Problems Will be Absent, Minimized or Managed (Bowel Obstruction)  Signs and symptoms of listed potential problems will be absent, minimized or managed by discharge/transition of care (reference Bowel Obstruction (Adult) CPG).   Outcome: Ongoing (interventions implemented as appropriate)  Pton regular diet. No complaints during shift.  No n/v. Denies pain.

## 2018-02-01 NOTE — DISCHARGE SUMMARY
The patient was admitted to Dr. Rey Griffith on 01/28/2018 with a diagnosis   of small-bowel obstruction.  The patient was discharged home by Dr. Rey Griffith on 02/01/2018 status post nonoperative resolution of small bowel   obstruction.    HOSPITAL COURSE:  This is a 52-year-old male seen in the Emergency Room with   abdominal pain, nausea and bloating.  He was originally seen in the Emergency   Room and sent home.  He then returned with the same symptoms.  On abdominal   exam, he was relatively unremarkable.  A CT scan was consistent with a small   bowel obstruction with a transition zone.  He was admitted with attempted   nonoperative management and nasogastric tube was placed.  The patient's symptoms   resolved.  The small bowel follow-through revealed delayed progress, but no   obstruction was seen.  His nasogastric tube was removed.  He originally had some   bloating after which he was kept for another day of observation.  All the   symptoms resolved.  The abdominal film showed this obstruction had completely   resolved such that on 02/01/2018 he is tolerating oral intake and his abdominal   exam was unremarkable.  He is passing flatus and all the symptoms have resolved.    He will be discharged home status post small bowel obstruction likely from   adhesions.  Condition good, regular diet, activity ad-omid, resume home   medicines.  Follow up in my office in 1 to 2 weeks.  Notify my office before   then if he has any return of these symptoms.  If so operative intervention will   likely be required.      TROY/JACKIE  dd: 02/01/2018 06:29:29 (CST)  td: 02/01/2018 09:57:22 (CST)  Doc ID   #[id number]  Job ID #[id number]    CC:

## 2018-02-01 NOTE — NURSING
Pt's discharge instructions and follow up appointments given and explained. Verbalized understanding. IV discontinued. Belongings at bedside. Awaiting transportation

## 2018-02-05 NOTE — PHYSICIAN QUERY
"PT Name: Arslan Henderson  MR #: [id number]     Physician Query Form - Documentation Clarification      CDS/: Yudelka Hammer               Contact information:    This form is a permanent document in the medical record.     Query Date: February 5, 2018    By submitting this query, we are merely seeking further clarification of documentation. Please utilize your independent clinical judgment when addressing the question(s) below.    The Medical record reflects the following:    Supporting Clinical Findings Location in Medical Record   admitted to Dr. Rey Griffith on 01/28/2018 with a diagnosis of small-bowel obstruction.      The patient was discharged home by Dr. Rey Griffith on 02/01/2018 status post nonoperative resolution of small bowel obstruction.    He will be discharged home status post small bowel obstruction likely from adhesions DC Summary                                                                                      Doctor, Please specify diagnosis or diagnoses associated with above clinical findings.     Please further specify "small bowel obstruction likely from adhesions".    Provider Use Only    (  x) Partial SBO    (  ) Complete SBO    (  ) Other - specify: ____________________                                                                                                           [  ] Clinically undetermined            "

## 2018-02-09 ENCOUNTER — HOSPITAL ENCOUNTER (EMERGENCY)
Age: 53
Discharge: HOME | End: 2018-02-09
Attending: EMERGENCY MEDICINE

## 2018-02-09 ENCOUNTER — APPOINTMENT (OUTPATIENT)
Dept: GENERAL RADIOLOGY | Age: 53
End: 2018-02-09
Attending: EMERGENCY MEDICINE

## 2018-02-09 VITALS
RESPIRATION RATE: 16 BRPM | DIASTOLIC BLOOD PRESSURE: 97 MMHG | HEIGHT: 68 IN | BODY MASS INDEX: 28.04 KG/M2 | TEMPERATURE: 97.5 F | HEART RATE: 70 BPM | WEIGHT: 185 LBS | SYSTOLIC BLOOD PRESSURE: 163 MMHG | OXYGEN SATURATION: 98 %

## 2018-02-09 DIAGNOSIS — S13.9XXA NECK SPRAIN, INITIAL ENCOUNTER: Primary | ICD-10-CM
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
## 2018-02-09 PROCEDURE — 99284 EMERGENCY DEPT VISIT MOD MDM: CPT | Performed by: EMERGENCY MEDICINE

## 2018-02-09 PROCEDURE — 99284 EMERGENCY DEPT VISIT MOD MDM: CPT

## 2018-02-09 PROCEDURE — 10002803 HB RX 637: Performed by: EMERGENCY MEDICINE

## 2018-02-09 PROCEDURE — 72040 X-RAY EXAM NECK SPINE 2-3 VW: CPT | Performed by: RADIOLOGY

## 2018-02-09 PROCEDURE — 72040 X-RAY EXAM NECK SPINE 2-3 VW: CPT

## 2018-02-09 RX ORDER — DIAZEPAM 5 MG/1
5 TABLET ORAL ONCE
Status: COMPLETED | OUTPATIENT
Start: 2018-02-09 | End: 2018-02-09

## 2018-02-09 RX ORDER — HYDROCODONE BITARTRATE AND ACETAMINOPHEN 5; 325 MG/1; MG/1
1-2 TABLET ORAL EVERY 4 HOURS PRN
Qty: 8 TABLET | Refills: 0 | Status: SHIPPED | OUTPATIENT
Start: 2018-02-09

## 2018-02-09 RX ORDER — DIAZEPAM 5 MG/1
5 TABLET ORAL 3 TIMES DAILY PRN
Qty: 8 TABLET | Refills: 0 | Status: SHIPPED | OUTPATIENT
Start: 2018-02-09

## 2018-02-09 RX ORDER — HYDROCODONE BITARTRATE AND ACETAMINOPHEN 5; 325 MG/1; MG/1
1 TABLET ORAL ONCE
Status: COMPLETED | OUTPATIENT
Start: 2018-02-09 | End: 2018-02-09

## 2018-02-09 RX ORDER — HYDROCODONE BITARTRATE AND ACETAMINOPHEN 5; 325 MG/1; MG/1
1 TABLET ORAL ONCE
Status: DISCONTINUED | OUTPATIENT
Start: 2018-02-09 | End: 2018-02-09

## 2018-02-09 RX ADMIN — HYDROCODONE BITARTRATE AND ACETAMINOPHEN 1 TABLET: 5; 325 TABLET ORAL at 01:48

## 2018-02-09 RX ADMIN — DIAZEPAM 5 MG: 5 TABLET ORAL at 01:48

## 2018-02-09 ASSESSMENT — ENCOUNTER SYMPTOMS
ABDOMINAL PAIN: 0
SHORTNESS OF BREATH: 0
NERVOUS/ANXIOUS: 1
ACTIVITY CHANGE: 1

## 2018-02-09 ASSESSMENT — PAIN SCALES - GENERAL
PAINLEVEL_OUTOF10: 4
PAINLEVEL_OUTOF10: 8